# Patient Record
Sex: MALE | Race: WHITE | URBAN - METROPOLITAN AREA
[De-identification: names, ages, dates, MRNs, and addresses within clinical notes are randomized per-mention and may not be internally consistent; named-entity substitution may affect disease eponyms.]

---

## 2021-09-22 ENCOUNTER — OFFICE VISIT (OUTPATIENT)
Dept: URBAN - METROPOLITAN AREA CLINIC 54 | Facility: CLINIC | Age: 71
End: 2021-09-22
Payer: MEDICARE

## 2021-09-22 DIAGNOSIS — H43.12 VITREOUS HEMORRHAGE, LEFT EYE: ICD-10-CM

## 2021-09-22 DIAGNOSIS — E11.3513 TYPE 2 DIABETES MELLITUS W/ PROLIFERATIVE DIABETIC RETINOPATHY W/ MACULAR EDEMA OF BILATERAL EYES: Primary | ICD-10-CM

## 2021-09-22 DIAGNOSIS — Z96.1 PRESENCE OF INTRAOCULAR LENS: ICD-10-CM

## 2021-09-22 PROCEDURE — 99214 OFFICE O/P EST MOD 30 MIN: CPT | Performed by: OPHTHALMOLOGY

## 2021-09-22 PROCEDURE — 92134 CPTRZ OPH DX IMG PST SGM RTA: CPT | Performed by: OPHTHALMOLOGY

## 2021-09-22 ASSESSMENT — INTRAOCULAR PRESSURE
OD: 10
OS: 11

## 2021-09-22 NOTE — IMPRESSION/PLAN
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
s/p focal OU 2003
s/p PRP x 2 OS 2017 OCT OU = tr IRF OD, tr IRF OS  / 1 Plan: OD: edema resolved (min IRF on OCT) without treatment = systemic control and obs OS: VH resolved, s/p PRP; DME resolved (min IRF on OCT)  = obs

RBAC's of treat prn vs treat standing vs treat and extend d/w patient. 
Patient elects prn = obs

12 month re-eval, OCT OU, [facility] OU

## 2022-10-26 ENCOUNTER — OFFICE VISIT (OUTPATIENT)
Dept: URBAN - METROPOLITAN AREA CLINIC 54 | Facility: CLINIC | Age: 72
End: 2022-10-26
Payer: MEDICARE

## 2022-10-26 DIAGNOSIS — E11.3513 TYPE 2 DIABETES MELLITUS W/ PROLIFERATIVE DIABETIC RETINOPATHY W/ MACULAR EDEMA OF BILATERAL EYES: Primary | ICD-10-CM

## 2022-10-26 DIAGNOSIS — H43.12 VITREOUS HEMORRHAGE, LEFT EYE: ICD-10-CM

## 2022-10-26 DIAGNOSIS — Z96.1 PRESENCE OF INTRAOCULAR LENS: ICD-10-CM

## 2022-10-26 PROCEDURE — 92134 CPTRZ OPH DX IMG PST SGM RTA: CPT | Performed by: OPHTHALMOLOGY

## 2022-10-26 PROCEDURE — 99214 OFFICE O/P EST MOD 30 MIN: CPT | Performed by: OPHTHALMOLOGY

## 2022-10-26 ASSESSMENT — INTRAOCULAR PRESSURE
OD: 10
OS: 12

## 2022-10-26 NOTE — IMPRESSION/PLAN
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
s/p focal OU 2003
s/p PRP x 2 OS 2017 OCT OU = mild temp IRF OD, mild nasal IRF OS  / 284 Plan: OD: edema resolved (min IRF on OCT) without treatment = systemic control and obs OS: VH resolved, s/p PRP; DME resolved (min IRF on OCT)  = obs

RBAC's of treat prn vs treat standing vs treat and extend d/w patient. 
Patient elects prn = obs

12 month re-eval, OCT OU, [facility] OU

## 2023-11-17 ENCOUNTER — Encounter (OUTPATIENT)
Dept: URBAN - METROPOLITAN AREA CLINIC 54 | Facility: CLINIC | Age: 73
End: 2023-11-17
Payer: MEDICARE

## 2023-11-17 PROCEDURE — 99212 OFFICE O/P EST SF 10 MIN: CPT | Performed by: OPHTHALMOLOGY

## 2023-11-17 PROCEDURE — 92134 CPTRZ OPH DX IMG PST SGM RTA: CPT | Performed by: OPHTHALMOLOGY

## 2024-05-16 ENCOUNTER — OFFICE VISIT (OUTPATIENT)
Dept: URBAN - METROPOLITAN AREA CLINIC 54 | Facility: CLINIC | Age: 74
End: 2024-05-16
Payer: MEDICARE

## 2024-05-16 DIAGNOSIS — E11.3513 TYPE 2 DIABETES MELLITUS W/ PROLIFERATIVE DIABETIC RETINOPATHY W/ MACULAR EDEMA OF BILATERAL EYES: Primary | ICD-10-CM

## 2024-05-16 DIAGNOSIS — Z96.1 PRESENCE OF INTRAOCULAR LENS: ICD-10-CM

## 2024-05-16 DIAGNOSIS — H43.12 VITREOUS HEMORRHAGE, LEFT EYE: ICD-10-CM

## 2024-05-16 PROCEDURE — 92134 CPTRZ OPH DX IMG PST SGM RTA: CPT | Performed by: OPHTHALMOLOGY

## 2024-05-16 PROCEDURE — 99213 OFFICE O/P EST LOW 20 MIN: CPT | Performed by: OPHTHALMOLOGY

## 2024-05-16 ASSESSMENT — INTRAOCULAR PRESSURE
OD: 9
OS: 11